# Patient Record
Sex: MALE | Race: OTHER | URBAN - METROPOLITAN AREA
[De-identification: names, ages, dates, MRNs, and addresses within clinical notes are randomized per-mention and may not be internally consistent; named-entity substitution may affect disease eponyms.]

---
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
## 2020-02-21 ENCOUNTER — OFFICE VISIT (OUTPATIENT)
Dept: INTERNAL MEDICINE CLINIC | Facility: CLINIC | Age: 20
End: 2020-02-21
Payer: COMMERCIAL

## 2020-02-21 VITALS
HEIGHT: 72 IN | HEART RATE: 72 BPM | DIASTOLIC BLOOD PRESSURE: 58 MMHG | SYSTOLIC BLOOD PRESSURE: 108 MMHG | WEIGHT: 151.38 LBS | BODY MASS INDEX: 20.5 KG/M2

## 2020-02-21 DIAGNOSIS — Z00.00 ANNUAL PHYSICAL EXAM: Primary | ICD-10-CM

## 2020-02-21 PROCEDURE — 99385 PREV VISIT NEW AGE 18-39: CPT | Performed by: INTERNAL MEDICINE

## 2020-02-21 NOTE — H&P
Angie Yañez is a 23year old male who presents for a complete physical exam.   HPI:   He has not had a physical recently. He feels well today. No specific issues for discussion today. No significant past medical history.   No previous surgeries o vomiting abdominal pain diarrhea constipation or rectal bleeding  : No urinary frequency dysuria or hematuria  MUSCULOSKELETAL: No leg swelling  NEURO: No headaches    EXAM:   GENERAL: Pleasant male appearing well in no distress  /58   Pulse 72   H

## 2020-02-21 NOTE — PATIENT INSTRUCTIONS
Your physical today was normal.  Please obtain blood tests soon when you can. Please follow a healthy diet and continue to regularly exercise.

## (undated) NOTE — MR AVS SNAPSHOT
[address]  [phone]               Thank you for choosing us for your health care visit with Marie Brothers. DO Peggy.   We are glad to serve you and happy to provide you with this sum Emotions become more stable. Has a greater concern for others. Thinks about his or her purpose in life. Has pride in his or her own work. Is able to recognize and cope with different types of stress.   Social Development   Has become self-reliant an [email or web address] will allow you to access patient instructions from your recent visit,  view other health information, and more. To sign up or find more information, go to [email or web address] and click on the Sign Up Now link in the [employer] box.      Enter

## (undated) NOTE — Clinical Note
Name:  Zeke Burdick Year:  12th Grade Class: Student ID No.:   Address:  [address] [address] Phone:  [phone] (home)  : 314/ 24year old   Name Relationship [address] Work Phone Home Phone Mobile Phone   1.  QUIANA implanted defibrillator? 12. Has anyone in your family had unexplained fainting, seizures, or near drowning?      BONE AND JOINT QUESTIONS Yes No   17. Have you ever had an injury to a bone, muscle, ligament, or tendon that caused you to miss a practice 39.Have you ever been unable to move your arms / legs after being hit /fall? 40. Have you ever become ill while exercising in the heat?     41. Do you get frequent muscle cramps when exercising? 42.  Do you or someone in your family have sickle cell · Location of point of maximal impulse (PMI) Yes    Pulses Yes    Lungs Yes    Abdomen Yes    Genitourinary (males only)* {N/A:2593}    Skin:  HSV, lesions suggestive of MRSA, tinea corporis Yes    Neurologic* Yes    MUSCULOSKELETAL     Neck Yes    Back Daryl Gottron hereby agree to submit to such testing and analysis by a certified laboratory.  We further understand and agree that the results of the performance-enhancing substance testing may be provided to certain individuals in my/our student’s high school as specifi

## (undated) NOTE — MR AVS SNAPSHOT
[place]  [address]  [phone]               Thank you for choosing us [facility]   We are glad to serve you and happy to provide you with this summary o information, go to [email or web address] and click on the Sign Up Now link in the [employer] box. Enter your [employer] Activation Code exactly as it appears below along with your Zip Code and Date of Birth to complete the sign-up process.  If you do

## (undated) NOTE — Clinical Note
[facility] [employer] of Child Health Examination       Student's Name  Pollo Doty Title                           Date    (If adding dates to the above immunization history section, put your initials by date(s) and sign here.)   ALTERNATIVE PROOF OF IMMUNITY   1 Diagnosis of asthma? Child wakes during the night coughing   Yes       No    Yes       No    Loss of function of one of paired organs? (eye/ear/kidney/testicle)   Yes       No      Birth Defects? Developmental delay?    Yes       No    Yes       No  Hospi Family History      yes              Ethnic Minority                             Signs of Insulin Resistance (hypertension, dyslipidemia, polycystic ovarian syndrome, acanthosis nigricans)          no                 At Risk    no   Lead Risk Questionnaire Controller medication (e.g. inhaled corticosteroid):   No Other   NEEDS/MODIFICATIONS required in the school setting  None DIETARY Needs/Restrictions     None   SPECIAL INSTRUCTIONS/DEVICES e.g. safety glasses, glass eye, chest protector for arrhyt

## (undated) NOTE — Clinical Note
2/28/2017          To Whom It May Concern:    Landon Nichols is currently under my medical care and may return to school at this time. Please excuse Vangie Lim for 2 days. The 21st and 27th. He may return to school on 3/1.   Activity is restricted as f